# Patient Record
Sex: MALE | Race: WHITE | NOT HISPANIC OR LATINO | Employment: FULL TIME | URBAN - METROPOLITAN AREA
[De-identification: names, ages, dates, MRNs, and addresses within clinical notes are randomized per-mention and may not be internally consistent; named-entity substitution may affect disease eponyms.]

---

## 2017-04-13 ENCOUNTER — GENERIC CONVERSION - ENCOUNTER (OUTPATIENT)
Dept: OTHER | Facility: OTHER | Age: 32
End: 2017-04-13

## 2017-10-30 ENCOUNTER — GENERIC CONVERSION - ENCOUNTER (OUTPATIENT)
Dept: OTHER | Facility: OTHER | Age: 32
End: 2017-10-30

## 2017-10-30 DIAGNOSIS — R68.89 OTHER GENERAL SYMPTOMS AND SIGNS: ICD-10-CM

## 2017-10-30 DIAGNOSIS — G47.00 INSOMNIA: ICD-10-CM

## 2017-10-30 DIAGNOSIS — E87.5 HYPERKALEMIA: ICD-10-CM

## 2017-10-30 DIAGNOSIS — E29.1 TESTICULAR HYPOFUNCTION: ICD-10-CM

## 2017-10-30 DIAGNOSIS — R53.83 OTHER FATIGUE: ICD-10-CM

## 2017-10-30 DIAGNOSIS — E04.1 NONTOXIC SINGLE THYROID NODULE: ICD-10-CM

## 2017-11-04 ENCOUNTER — APPOINTMENT (OUTPATIENT)
Dept: LAB | Facility: HOSPITAL | Age: 32
End: 2017-11-04
Payer: COMMERCIAL

## 2017-11-04 ENCOUNTER — HOSPITAL ENCOUNTER (OUTPATIENT)
Dept: ULTRASOUND IMAGING | Facility: HOSPITAL | Age: 32
Discharge: HOME | End: 2017-11-04
Payer: COMMERCIAL

## 2017-11-04 DIAGNOSIS — E29.1 TESTICULAR HYPOFUNCTION: ICD-10-CM

## 2017-11-04 DIAGNOSIS — R53.83 OTHER FATIGUE: ICD-10-CM

## 2017-11-04 DIAGNOSIS — E04.1 NONTOXIC SINGLE THYROID NODULE: ICD-10-CM

## 2017-11-04 DIAGNOSIS — G47.00 INSOMNIA: ICD-10-CM

## 2017-11-04 DIAGNOSIS — R68.89 OTHER GENERAL SYMPTOMS AND SIGNS: ICD-10-CM

## 2017-11-04 LAB
25(OH)D3 SERPL-MCNC: 35.4 NG/ML (ref 30–100)
ALBUMIN SERPL BCP-MCNC: 4.1 G/DL (ref 3.5–5)
ALP SERPL-CCNC: 56 U/L (ref 46–116)
ALT SERPL W P-5'-P-CCNC: 75 U/L (ref 12–78)
ANION GAP SERPL CALCULATED.3IONS-SCNC: 3 MMOL/L (ref 4–13)
AST SERPL W P-5'-P-CCNC: 38 U/L (ref 5–45)
BASOPHILS # BLD AUTO: 0.05 THOUSANDS/ΜL (ref 0–0.1)
BASOPHILS NFR BLD AUTO: 1 % (ref 0–1)
BILIRUB SERPL-MCNC: 1.82 MG/DL (ref 0.2–1)
BUN SERPL-MCNC: 14 MG/DL (ref 5–25)
CALCIUM SERPL-MCNC: 9.2 MG/DL (ref 8.3–10.1)
CHLORIDE SERPL-SCNC: 106 MMOL/L (ref 100–108)
CHOLEST SERPL-MCNC: 163 MG/DL (ref 50–200)
CO2 SERPL-SCNC: 33 MMOL/L (ref 21–32)
CREAT SERPL-MCNC: 1.08 MG/DL (ref 0.6–1.3)
EOSINOPHIL # BLD AUTO: 0.08 THOUSAND/ΜL (ref 0–0.61)
EOSINOPHIL NFR BLD AUTO: 2 % (ref 0–6)
ERYTHROCYTE [DISTWIDTH] IN BLOOD BY AUTOMATED COUNT: 13.8 % (ref 11.6–15.1)
GFR SERPL CREATININE-BSD FRML MDRD: 89 ML/MIN/1.73SQ M
GLUCOSE P FAST SERPL-MCNC: 76 MG/DL (ref 65–99)
HCT VFR BLD AUTO: 44 % (ref 36.5–49.3)
HDLC SERPL-MCNC: 84 MG/DL (ref 40–60)
HGB BLD-MCNC: 14.6 G/DL (ref 12–17)
LDLC SERPL CALC-MCNC: 70 MG/DL (ref 0–100)
LYMPHOCYTES # BLD AUTO: 1.12 THOUSANDS/ΜL (ref 0.6–4.47)
LYMPHOCYTES NFR BLD AUTO: 30 % (ref 14–44)
MCH RBC QN AUTO: 30.7 PG (ref 26.8–34.3)
MCHC RBC AUTO-ENTMCNC: 33.2 G/DL (ref 31.4–37.4)
MCV RBC AUTO: 92 FL (ref 82–98)
MONOCYTES # BLD AUTO: 0.13 THOUSAND/ΜL (ref 0.17–1.22)
MONOCYTES NFR BLD AUTO: 4 % (ref 4–12)
NEUTROPHILS # BLD AUTO: 2.38 THOUSANDS/ΜL (ref 1.85–7.62)
NEUTS SEG NFR BLD AUTO: 63 % (ref 43–75)
NRBC BLD AUTO-RTO: 0 /100 WBCS
PLATELET # BLD AUTO: 178 THOUSANDS/UL (ref 149–390)
PMV BLD AUTO: 10.5 FL (ref 8.9–12.7)
POTASSIUM SERPL-SCNC: 5.7 MMOL/L (ref 3.5–5.3)
PROT SERPL-MCNC: 7.2 G/DL (ref 6.4–8.2)
RBC # BLD AUTO: 4.76 MILLION/UL (ref 3.88–5.62)
SODIUM SERPL-SCNC: 142 MMOL/L (ref 136–145)
T4 FREE SERPL-MCNC: 0.87 NG/DL (ref 0.76–1.46)
TESTOST SERPL-MCNC: 541.9 NG/DL (ref 241–827)
TRIGL SERPL-MCNC: 46 MG/DL
TSH SERPL DL<=0.05 MIU/L-ACNC: 0.86 UIU/ML (ref 0.36–3.74)
WBC # BLD AUTO: 3.76 THOUSAND/UL (ref 4.31–10.16)

## 2017-11-04 PROCEDURE — 84439 ASSAY OF FREE THYROXINE: CPT

## 2017-11-04 PROCEDURE — 84403 ASSAY OF TOTAL TESTOSTERONE: CPT

## 2017-11-04 PROCEDURE — 84443 ASSAY THYROID STIM HORMONE: CPT

## 2017-11-04 PROCEDURE — 80061 LIPID PANEL: CPT

## 2017-11-04 PROCEDURE — 80053 COMPREHEN METABOLIC PANEL: CPT

## 2017-11-04 PROCEDURE — 85025 COMPLETE CBC W/AUTO DIFF WBC: CPT

## 2017-11-04 PROCEDURE — 36415 COLL VENOUS BLD VENIPUNCTURE: CPT

## 2017-11-04 PROCEDURE — 76536 US EXAM OF HEAD AND NECK: CPT

## 2017-11-04 PROCEDURE — 82306 VITAMIN D 25 HYDROXY: CPT

## 2017-11-06 ENCOUNTER — GENERIC CONVERSION - ENCOUNTER (OUTPATIENT)
Dept: OTHER | Facility: OTHER | Age: 32
End: 2017-11-06

## 2017-11-22 ENCOUNTER — APPOINTMENT (OUTPATIENT)
Dept: LAB | Facility: HOSPITAL | Age: 32
End: 2017-11-22
Payer: COMMERCIAL

## 2017-11-22 DIAGNOSIS — E87.5 HYPERKALEMIA: ICD-10-CM

## 2017-11-22 LAB
ALBUMIN SERPL BCP-MCNC: 4.2 G/DL (ref 3.5–5)
ALP SERPL-CCNC: 50 U/L (ref 46–116)
ALT SERPL W P-5'-P-CCNC: 56 U/L (ref 12–78)
ANION GAP SERPL CALCULATED.3IONS-SCNC: 10 MMOL/L (ref 4–13)
AST SERPL W P-5'-P-CCNC: 24 U/L (ref 5–45)
BASOPHILS # BLD AUTO: 0.04 THOUSANDS/ΜL (ref 0–0.1)
BASOPHILS NFR BLD AUTO: 1 % (ref 0–1)
BILIRUB SERPL-MCNC: 1.6 MG/DL (ref 0.2–1)
BUN SERPL-MCNC: 33 MG/DL (ref 5–25)
CALCIUM SERPL-MCNC: 8.8 MG/DL (ref 8.3–10.1)
CHLORIDE SERPL-SCNC: 100 MMOL/L (ref 100–108)
CO2 SERPL-SCNC: 24 MMOL/L (ref 21–32)
CREAT SERPL-MCNC: 0.85 MG/DL (ref 0.6–1.3)
EOSINOPHIL # BLD AUTO: 0.14 THOUSAND/ΜL (ref 0–0.61)
EOSINOPHIL NFR BLD AUTO: 3 % (ref 0–6)
ERYTHROCYTE [DISTWIDTH] IN BLOOD BY AUTOMATED COUNT: 14.1 % (ref 11.6–15.1)
GFR SERPL CREATININE-BSD FRML MDRD: 114 ML/MIN/1.73SQ M
GLUCOSE P FAST SERPL-MCNC: 81 MG/DL (ref 65–99)
HCT VFR BLD AUTO: 38.8 % (ref 36.5–49.3)
HGB BLD-MCNC: 13.3 G/DL (ref 12–17)
LYMPHOCYTES # BLD AUTO: 1.56 THOUSANDS/ΜL (ref 0.6–4.47)
LYMPHOCYTES NFR BLD AUTO: 32 % (ref 14–44)
MCH RBC QN AUTO: 30.9 PG (ref 26.8–34.3)
MCHC RBC AUTO-ENTMCNC: 34.3 G/DL (ref 31.4–37.4)
MCV RBC AUTO: 90 FL (ref 82–98)
MONOCYTES # BLD AUTO: 0.26 THOUSAND/ΜL (ref 0.17–1.22)
MONOCYTES NFR BLD AUTO: 5 % (ref 4–12)
NEUTROPHILS # BLD AUTO: 2.93 THOUSANDS/ΜL (ref 1.85–7.62)
NEUTS SEG NFR BLD AUTO: 59 % (ref 43–75)
NRBC BLD AUTO-RTO: 0 /100 WBCS
PLATELET # BLD AUTO: 201 THOUSANDS/UL (ref 149–390)
PMV BLD AUTO: 9.9 FL (ref 8.9–12.7)
POTASSIUM SERPL-SCNC: 3.8 MMOL/L (ref 3.5–5.3)
PROT SERPL-MCNC: 7.1 G/DL (ref 6.4–8.2)
RBC # BLD AUTO: 4.3 MILLION/UL (ref 3.88–5.62)
SODIUM SERPL-SCNC: 134 MMOL/L (ref 136–145)
WBC # BLD AUTO: 4.93 THOUSAND/UL (ref 4.31–10.16)

## 2017-11-22 PROCEDURE — 36415 COLL VENOUS BLD VENIPUNCTURE: CPT

## 2017-11-22 PROCEDURE — 80053 COMPREHEN METABOLIC PANEL: CPT

## 2017-11-22 PROCEDURE — 85025 COMPLETE CBC W/AUTO DIFF WBC: CPT

## 2017-11-27 ENCOUNTER — ALLSCRIPTS OFFICE VISIT (OUTPATIENT)
Dept: OTHER | Facility: OTHER | Age: 32
End: 2017-11-27

## 2018-01-12 NOTE — RESULT NOTES
Message   Recorded as Task   Date: 11/06/2017 05:42 AM, Created By: Wali Butter   Task Name: Follow Up   Assigned To: Pratibha Rodriguez   Regarding Patient: Margo Sheikh, Status: Active   [facility] 06 Nov 2017 5:42 AM     TASK CREATED  his bloodwork showed a high potassium and borderline low bloodcount  probably both are non specific findings but rest was ok  i am putting in to recheck before follow up appointment   [facility] - 06 Nov 2017 11:18 AM     TASK EDITED  I spoke with Deysi Cassidy and he is aware of his results, and will get the next set of labs befor his appointment on 11/27/201        Verified Results  (1) CBC/PLT/DIFF [id number] 08:54AM Wali Ana   TW Order Number: [id number]     Test Name Result Flag Reference   WBC COUNT 3 76 Thousand/uL L 4 31-10 16   RBC COUNT 4 76 Million/uL  3 88-5 62   HEMOGLOBIN 14 6 g/dL  12 0-17 0   HEMATOCRIT 44 0 %  36 5-49 3   MCV 92 fL  82-98   MCH 30 7 pg  26 8-34 3   MCHC 33 2 g/dL  31 4-37 4   RDW 13 8 %  11 6-15 1   MPV 10 5 fL  8 9-12 7   PLATELET COUNT 621 Thousands/uL  149-390   nRBC AUTOMATED 0 /100 WBCs     NEUTROPHILS RELATIVE PERCENT 63 %  43-75   LYMPHOCYTES RELATIVE PERCENT 30 %  14-44   MONOCYTES RELATIVE PERCENT 4 %  4-12   EOSINOPHILS RELATIVE PERCENT 2 %  0-6   BASOPHILS RELATIVE PERCENT 1 %  0-1   NEUTROPHILS ABSOLUTE COUNT 2 38 Thousands/? ??L  1 85-7 62   LYMPHOCYTES ABSOLUTE COUNT 1 12 Thousands/? ??L  0 60-4 47   MONOCYTES ABSOLUTE COUNT 0 13 Thousand/? ??L L 0 17-1 22   EOSINOPHILS ABSOLUTE COUNT 0 08 Thousand/? ??L  0 00-0 61   BASOPHILS ABSOLUTE COUNT 0 05 Thousands/? ??L  0 00-0 10     (1) COMPREHENSIVE METABOLIC PANEL [id number] 13:90XY Wali Perez   TW Order Number: [id number]     Test Name Result Flag Reference   SODIUM 142 mmol/L  136-145   POTASSIUM 5 7 mmol/L H 3 5-5 3   CHLORIDE 106 mmol/L  100-108   CARBON DIOXIDE 33 mmol/L H 21-32   ANION GAP (CALC) 3 mmol/L L 4-13   BLOOD UREA NITROGEN 14 mg/dL  5-25   CREATININE 1 08 mg/dL  0 60-1 30   Standardized to IDMS reference method   CALCIUM 9 2 mg/dL  8 3-10 1   BILI, TOTAL 1 82 mg/dL H 0 20-1 00   ALK PHOSPHATAS 56 U/L     ALT (SGPT) 75 U/L  12-78   Specimen collection should occur prior to Sulfasalazine administration due to the potential for falsely depressed results  AST(SGOT) 38 U/L  5-45   Specimen collection should occur prior to Sulfasalazine administration due to the potential for falsely depressed results  ALBUMIN 4 1 g/dL  3 5-5 0   TOTAL PROTEIN 7 2 g/dL  6 4-8 2   eGFR 89 ml/min/1 73sq m     National Kidney Disease Education Program recommendations are as follows:  GFR calculation is accurate only with a steady state creatinine  Chronic Kidney disease less than 60 ml/min/1 73 sq  meters  Kidney failure less than 15 ml/min/1 73 sq  meters  GLUCOSE FASTING 76 mg/dL  65-99   Specimen collection should occur prior to Sulfasalazine administration due to the potential for falsely depressed results  Specimen collection should occur prior to Sulfapyridine administration due to the potential for falsely elevated results  (1) LIPID PANEL, FASTING [id number] 08:54AM Connie Joel    Order Number: [id number]     Test Name Result Flag Reference   CHOLESTEROL 163 mg/dL     HDL,DIRECT 84 mg/dL H 40-60   Specimen collection should occur prior to Metamizole administration due to the potential for falsley depressed results  LDL CHOLESTEROL CALCULATED 70 mg/dL  0-100   Triglyceride:        Normal <150 mg/dl   Borderline High 150-199 mg/dl   High 200-499 mg/dl   Very High >499 mg/dl      Cholesterol:       Desirable <200 mg/dl    Borderline High 200-239 mg/dl    High >239 mg/dl      HDL Cholesterol:       High>59 mg/dL    Low <41 mg/dL      This screening LDL is a calculated result  It does not have the accuracy of the Direct Measured LDL in the monitoring of patients with hyperlipidemia and/or statin therapy     Direct Measure LDL (QXD682) must be ordered separately in these patients  TRIGLYCERIDES 46 mg/dL  <=150   Specimen collection should occur prior to N-Acetylcysteine or Metamizole administration due to the potential for falsely depressed results  (1) T4, FREE [id number] 08:54AM Kip Moscoso    Order Number: [id number]     Test Name Result Flag Reference   T4,FREE 0 87 ng/dL  0 76-1 46   Specimen collection should occur prior to Sulfasalazine administration due to the potential for falsely elevated results  (1) TSH [id number] 08:54AM Kip Moscoso    Order Number: [id number]     Test Name Result Flag Reference   TSH 0 856 uIU/mL  0 358-3 740   Patients undergoing fluorescein dye angiography may retain small amounts of fluorescein in the body for 48-72 hours post procedure  Samples containing fluorescein can produce falsely depressed TSH values  If the patient had this procedure,a specimen should be resubmitted post fluorescein clearance  (1) VITAMIN D 25-HYDROXY [id number] 08:54AM Kip Moscoso    Order Number: [id number]     Test Name Result Flag Reference   VIT D 25-HYDROX 35 4 ng/mL  30 0-100 0   This assay is a certified procedure of the CDC Vitamin D Standardization Certification Program (VDSCP)     Deficiency <20ng/ml   Insufficiency 20-30ng/ml   Sufficient  ng/ml     *Patients undergoing fluorescein dye angiography may retain small amounts of fluorescein in the body for 48-72 hours post procedure  Samples containing fluorescein can produce falsely elevated Vitamin D values  If the patient had this procedure, a specimen should be resubmitted post fluorescein clearance  (1) TESTOSTERONE [id number] 08:54AM Kip Moscoso    Order Number: [id number]     Test Name Result Flag Reference   TESTOSTERONE 541 9 ng/dL  952-967       Plan  Hyperkalemia    · (1) CBC/PLT/DIFF; Status:Active;  Requested DANNY:[id number];    · (1) COMPREHENSIVE METABOLIC PANEL; Status:Active;  Requested ZQL:[id number];

## 2018-01-14 NOTE — RESULT NOTES
Message   Recorded as Task   Date: 11/22/2017 02:47 PM, Created By: Radha Saldivar   Task Name: Follow Up   Assigned To: Fozia Gupta   Regarding Patient: Gladys Le, Status: Active   CommentLaurine Ortiz - 22 Nov 2017 2:47 PM     TASK CREATED  bloodwork improved on recheck   Charity Siu - 22 Nov 2017 3:03 PM     TASK EDITED  I spoke with Mame Mckeon and he is aware of his results

## 2018-01-22 VITALS
WEIGHT: 175 LBS | RESPIRATION RATE: 12 BRPM | DIASTOLIC BLOOD PRESSURE: 60 MMHG | BODY MASS INDEX: 26.52 KG/M2 | SYSTOLIC BLOOD PRESSURE: 110 MMHG | HEART RATE: 60 BPM | HEIGHT: 68 IN | TEMPERATURE: 96.6 F

## 2018-01-22 VITALS
HEART RATE: 76 BPM | DIASTOLIC BLOOD PRESSURE: 64 MMHG | HEIGHT: 68 IN | RESPIRATION RATE: 12 BRPM | BODY MASS INDEX: 25.99 KG/M2 | SYSTOLIC BLOOD PRESSURE: 106 MMHG | WEIGHT: 171.5 LBS | TEMPERATURE: 97.8 F

## 2018-12-17 ENCOUNTER — OFFICE VISIT (OUTPATIENT)
Dept: INTERNAL MEDICINE CLINIC | Facility: CLINIC | Age: 33
End: 2018-12-17
Payer: OTHER GOVERNMENT

## 2018-12-17 VITALS
BODY MASS INDEX: 27.37 KG/M2 | HEART RATE: 57 BPM | HEIGHT: 68 IN | OXYGEN SATURATION: 97 % | SYSTOLIC BLOOD PRESSURE: 111 MMHG | DIASTOLIC BLOOD PRESSURE: 70 MMHG | TEMPERATURE: 98.1 F | WEIGHT: 180.6 LBS

## 2018-12-17 DIAGNOSIS — M79.662 BILATERAL CALF PAIN: ICD-10-CM

## 2018-12-17 DIAGNOSIS — R60.0 PEDAL EDEMA: Primary | ICD-10-CM

## 2018-12-17 DIAGNOSIS — E55.9 VITAMIN D DEFICIENCY: ICD-10-CM

## 2018-12-17 DIAGNOSIS — R20.2 NUMBNESS AND TINGLING OF RIGHT LOWER EXTREMITY: ICD-10-CM

## 2018-12-17 DIAGNOSIS — M79.661 BILATERAL CALF PAIN: ICD-10-CM

## 2018-12-17 DIAGNOSIS — R20.0 NUMBNESS AND TINGLING OF RIGHT LOWER EXTREMITY: ICD-10-CM

## 2018-12-17 PROCEDURE — 99214 OFFICE O/P EST MOD 30 MIN: CPT | Performed by: INTERNAL MEDICINE

## 2018-12-17 RX ORDER — UBIDECARENONE 75 MG
CAPSULE ORAL DAILY
COMMUNITY

## 2018-12-18 PROBLEM — E55.9 VITAMIN D DEFICIENCY: Status: ACTIVE | Noted: 2018-12-18

## 2018-12-18 PROBLEM — R60.0 PEDAL EDEMA: Status: ACTIVE | Noted: 2018-12-18

## 2018-12-18 NOTE — PROGRESS NOTES
Assessment/Plan:  We discussed the differential diagnosis of his symptoms  At this time likely cause is venous stasis  Will get a venous duplex to rule out any DVT  Check vitamin-D and B12 levels with previous history of deficiencies  Start compression stockings  Tingling and numbness likely due to pedal edema vs B12 deficiency  Diagnoses and all orders for this visit:    Pedal edema  -     CBC and differential  -     Comprehensive metabolic panel  -     Magnesium  -     Vitamin B12  -     Vitamin D 25 hydroxy  -     NT-BNP PRO  -     VAS lower limb venous duplex study, complete bilateral; Future  -     CompreFit (below knee) 20-30 mmHg    Bilateral calf pain  -     CBC and differential  -     Comprehensive metabolic panel  -     Magnesium  -     Vitamin B12  -     Vitamin D 25 hydroxy  -     NT-BNP PRO  -     VAS lower limb venous duplex study, complete bilateral; Future  -     CompreFit (below knee) 20-30 mmHg    Vitamin D deficiency  -     CBC and differential  -     Comprehensive metabolic panel  -     Magnesium  -     Vitamin B12  -     Vitamin D 25 hydroxy  -     NT-BNP PRO    Numbness and tingling of right lower extremity  -     CBC and differential  -     Comprehensive metabolic panel  -     Magnesium  -     Vitamin B12  -     Vitamin D 25 hydroxy  -     NT-BNP PRO    Other orders  -     cyanocobalamin (VITAMIN B-12) 100 mcg tablet; Take by mouth daily          Subjective:   Chief Complaint   Patient presents with    Leg Swelling     pain and swelling in legs for months        Patient ID: Jerilyn Clemente is a 26 y o  male  He comes in for an acute appointment  He notes that for the last several weeks to months he has had bilateral lower extremity edema and pain  He has noticed some tingling and numbness on the lateral aspect of right foot  He denies any falls or injuries, no back pain  No shortness of breath or orthopnea  He has running about 3-5 miles every day with no discomfort    He notes that he went to the physicians at the [facility] and the prescribe him pain medications without on distending what his pain is from  He was told that he has vitamin-D deficiency but not taking any supplements at this time  The following portions of the patient's history were reviewed and updated as appropriate: current medications, past medical history, past social history and past surgical history  PHQ-9 Depression Screening    PHQ-9:    Frequency of the following problems over the past two weeks:                Current Outpatient Prescriptions:     cyanocobalamin (VITAMIN B-12) 100 mcg tablet, Take by mouth daily, Disp: , Rfl:     Review of Systems   Constitutional: Negative for fatigue, fever and unexpected weight change  HENT: Negative for ear pain, hearing loss and sore throat  Eyes: Negative for pain and discharge  Respiratory: Negative for cough, chest tightness and shortness of breath  Cardiovascular: Positive for leg swelling  Negative for chest pain and palpitations  Gastrointestinal: Negative for abdominal pain, blood in stool, constipation, diarrhea and nausea  Genitourinary: Negative for dysuria, frequency and hematuria  Musculoskeletal: Positive for arthralgias  Negative for joint swelling  Skin: Negative for rash  Allergic/Immunologic: Negative for immunocompromised state  Neurological: Negative for dizziness and headaches  Hematological: Negative for adenopathy  Psychiatric/Behavioral: Negative for confusion and sleep disturbance  Objective:  /70 (BP Location: Left arm, Patient Position: Sitting, Cuff Size: Standard)   Pulse 57   Temp 98 1 °F (36 7 °C) (Tympanic)   Ht 5' 8" (1 727 m)   Wt 81 9 kg (180 lb 9 6 oz)   SpO2 97%   BMI 27 46 kg/m²      Physical Exam   Constitutional: He appears well-developed and well-nourished  HENT:   Head: Normocephalic and atraumatic     Right Ear: Tympanic membrane normal    Left Ear: Tympanic membrane normal  Nose: Nose normal    Mouth/Throat: Oropharynx is clear and moist  No posterior oropharyngeal edema or posterior oropharyngeal erythema  Eyes: Pupils are equal, round, and reactive to light  Conjunctivae are normal  Right eye exhibits no discharge  Neck: Normal range of motion  Neck supple  No thyromegaly present  Cardiovascular: Normal rate, regular rhythm, S1 normal, S2 normal and normal heart sounds  PMI is not displaced  No murmur heard  Pulmonary/Chest: Effort normal and breath sounds normal  No accessory muscle usage  No apnea  No respiratory distress  He has no rhonchi  He has no rales  Abdominal: Soft  Normal appearance and bowel sounds are normal  He exhibits no shifting dullness  There is no hepatosplenomegaly  There is no tenderness  There is no rebound and no CVA tenderness  Musculoskeletal: Normal range of motion  He exhibits no edema or tenderness  Legs:  Lymphadenopathy:     He has no cervical adenopathy  Neurological: He is alert  Skin: Skin is warm and intact  No rash noted  Psychiatric: He has a normal mood and affect  His speech is normal    Nursing note and vitals reviewed  No results found for this or any previous visit (from the past 1008 hour(s))  ]    No results found

## 2018-12-19 ENCOUNTER — APPOINTMENT (OUTPATIENT)
Dept: LAB | Facility: HOSPITAL | Age: 33
End: 2018-12-19
Payer: OTHER GOVERNMENT

## 2018-12-19 ENCOUNTER — HOSPITAL ENCOUNTER (OUTPATIENT)
Dept: NON INVASIVE DIAGNOSTICS | Facility: HOSPITAL | Age: 33
Discharge: HOME | End: 2018-12-19
Payer: OTHER GOVERNMENT

## 2018-12-19 DIAGNOSIS — M79.661 BILATERAL CALF PAIN: ICD-10-CM

## 2018-12-19 DIAGNOSIS — M79.662 BILATERAL CALF PAIN: ICD-10-CM

## 2018-12-19 DIAGNOSIS — R60.0 PEDAL EDEMA: ICD-10-CM

## 2018-12-19 LAB
25(OH)D3 SERPL-MCNC: 24.9 NG/ML (ref 30–100)
ALBUMIN SERPL BCP-MCNC: 3.9 G/DL (ref 3.5–5)
ALP SERPL-CCNC: 71 U/L (ref 46–116)
ALT SERPL W P-5'-P-CCNC: 60 U/L (ref 12–78)
ANION GAP SERPL CALCULATED.3IONS-SCNC: 9 MMOL/L (ref 4–13)
AST SERPL W P-5'-P-CCNC: 100 U/L (ref 5–45)
BASOPHILS # BLD AUTO: 0.05 THOUSANDS/ΜL (ref 0–0.1)
BASOPHILS NFR BLD AUTO: 1 % (ref 0–1)
BILIRUB SERPL-MCNC: 0.37 MG/DL (ref 0.2–1)
BUN SERPL-MCNC: 51 MG/DL (ref 5–25)
CALCIUM SERPL-MCNC: 9.1 MG/DL (ref 8.3–10.1)
CHLORIDE SERPL-SCNC: 103 MMOL/L (ref 100–108)
CO2 SERPL-SCNC: 28 MMOL/L (ref 21–32)
CREAT SERPL-MCNC: 1 MG/DL (ref 0.6–1.3)
EOSINOPHIL # BLD AUTO: 0.08 THOUSAND/ΜL (ref 0–0.61)
EOSINOPHIL NFR BLD AUTO: 1 % (ref 0–6)
ERYTHROCYTE [DISTWIDTH] IN BLOOD BY AUTOMATED COUNT: 12.9 % (ref 11.6–15.1)
GFR SERPL CREATININE-BSD FRML MDRD: 97 ML/MIN/1.73SQ M
GLUCOSE SERPL-MCNC: 78 MG/DL (ref 65–140)
HCT VFR BLD AUTO: 44.9 % (ref 36.5–49.3)
HGB BLD-MCNC: 14.2 G/DL (ref 12–17)
IMM GRANULOCYTES # BLD AUTO: 0.02 THOUSAND/UL (ref 0–0.2)
IMM GRANULOCYTES NFR BLD AUTO: 0 % (ref 0–2)
LYMPHOCYTES # BLD AUTO: 1.92 THOUSANDS/ΜL (ref 0.6–4.47)
LYMPHOCYTES NFR BLD AUTO: 25 % (ref 14–44)
MAGNESIUM SERPL-MCNC: 1.6 MG/DL (ref 1.6–2.6)
MCH RBC QN AUTO: 29.7 PG (ref 26.8–34.3)
MCHC RBC AUTO-ENTMCNC: 31.6 G/DL (ref 31.4–37.4)
MCV RBC AUTO: 94 FL (ref 82–98)
MONOCYTES # BLD AUTO: 0.38 THOUSAND/ΜL (ref 0.17–1.22)
MONOCYTES NFR BLD AUTO: 5 % (ref 4–12)
NEUTROPHILS # BLD AUTO: 5.11 THOUSANDS/ΜL (ref 1.85–7.62)
NEUTS SEG NFR BLD AUTO: 68 % (ref 43–75)
NRBC BLD AUTO-RTO: 0 /100 WBCS
NT-PROBNP SERPL-MCNC: 35 PG/ML
PLATELET # BLD AUTO: 182 THOUSANDS/UL (ref 149–390)
PMV BLD AUTO: 10.1 FL (ref 8.9–12.7)
POTASSIUM SERPL-SCNC: 3.8 MMOL/L (ref 3.5–5.3)
PROT SERPL-MCNC: 7.2 G/DL (ref 6.4–8.2)
RBC # BLD AUTO: 4.78 MILLION/UL (ref 3.88–5.62)
SODIUM SERPL-SCNC: 140 MMOL/L (ref 136–145)
VIT B12 SERPL-MCNC: 714 PG/ML (ref 100–900)
WBC # BLD AUTO: 7.56 THOUSAND/UL (ref 4.31–10.16)

## 2018-12-19 PROCEDURE — 82607 VITAMIN B-12: CPT | Performed by: INTERNAL MEDICINE

## 2018-12-19 PROCEDURE — 82306 VITAMIN D 25 HYDROXY: CPT | Performed by: INTERNAL MEDICINE

## 2018-12-19 PROCEDURE — 93970 EXTREMITY STUDY: CPT

## 2018-12-19 PROCEDURE — 36415 COLL VENOUS BLD VENIPUNCTURE: CPT | Performed by: INTERNAL MEDICINE

## 2018-12-19 PROCEDURE — 80053 COMPREHEN METABOLIC PANEL: CPT | Performed by: INTERNAL MEDICINE

## 2018-12-19 PROCEDURE — 83880 ASSAY OF NATRIURETIC PEPTIDE: CPT | Performed by: INTERNAL MEDICINE

## 2018-12-19 PROCEDURE — 83735 ASSAY OF MAGNESIUM: CPT | Performed by: INTERNAL MEDICINE

## 2018-12-19 PROCEDURE — 85025 COMPLETE CBC W/AUTO DIFF WBC: CPT | Performed by: INTERNAL MEDICINE

## 2018-12-20 ENCOUNTER — TELEPHONE (OUTPATIENT)
Dept: INTERNAL MEDICINE CLINIC | Facility: CLINIC | Age: 33
End: 2018-12-20

## 2018-12-20 DIAGNOSIS — R74.8 ABNORMAL LIVER ENZYMES: ICD-10-CM

## 2018-12-20 DIAGNOSIS — E55.9 VITAMIN D DEFICIENCY: Primary | ICD-10-CM

## 2018-12-20 PROCEDURE — 93970 EXTREMITY STUDY: CPT | Performed by: SURGERY

## 2018-12-20 NOTE — TELEPHONE ENCOUNTER
----- Message from Austyn Ruano MD sent at 12/20/2018  9:33 AM EST -----  Vitamin-D levels were slightly low, I sent in a prescription for supplement  otherwise blood work was okay except for elevated liver function test   I would recommend to have blood work again in 1 month, refrain from alcohol use

## 2018-12-20 NOTE — TELEPHONE ENCOUNTER
----- Message from Claudene Hammans, MD sent at 12/20/2018  9:31 AM EST -----  No blood clots on venous duplex

## 2018-12-20 NOTE — TELEPHONE ENCOUNTER
I spoke with Neeta Pop and he is aware of his results and understood instructions  I mailed out new lab slip

## 2019-11-11 ENCOUNTER — APPOINTMENT (EMERGENCY)
Dept: RADIOLOGY | Facility: HOSPITAL | Age: 34
End: 2019-11-11
Payer: OTHER GOVERNMENT

## 2019-11-11 ENCOUNTER — TELEPHONE (OUTPATIENT)
Dept: INTERNAL MEDICINE CLINIC | Facility: CLINIC | Age: 34
End: 2019-11-11

## 2019-11-11 ENCOUNTER — HOSPITAL ENCOUNTER (EMERGENCY)
Facility: HOSPITAL | Age: 34
Discharge: HOME | End: 2019-11-11
Attending: EMERGENCY MEDICINE
Payer: OTHER GOVERNMENT

## 2019-11-11 VITALS
HEART RATE: 60 BPM | OXYGEN SATURATION: 99 % | DIASTOLIC BLOOD PRESSURE: 73 MMHG | SYSTOLIC BLOOD PRESSURE: 122 MMHG | RESPIRATION RATE: 18 BRPM

## 2019-11-11 VITALS
SYSTOLIC BLOOD PRESSURE: 128 MMHG | DIASTOLIC BLOOD PRESSURE: 71 MMHG | WEIGHT: 184 LBS | HEART RATE: 55 BPM | BODY MASS INDEX: 27.98 KG/M2

## 2019-11-11 DIAGNOSIS — S43.005A CLOSED DISLOCATION OF LEFT SHOULDER, INITIAL ENCOUNTER: Primary | ICD-10-CM

## 2019-11-11 DIAGNOSIS — S43.005A DISLOCATION OF LEFT SHOULDER JOINT, INITIAL ENCOUNTER: Primary | ICD-10-CM

## 2019-11-11 PROCEDURE — 23650 CLTX SHO DSLC W/MNPJ WO ANES: CPT | Performed by: EMERGENCY MEDICINE

## 2019-11-11 PROCEDURE — 99283 EMERGENCY DEPT VISIT LOW MDM: CPT

## 2019-11-11 PROCEDURE — 73030 X-RAY EXAM OF SHOULDER: CPT

## 2019-11-11 PROCEDURE — 73020 X-RAY EXAM OF SHOULDER: CPT

## 2019-11-11 PROCEDURE — 99203 OFFICE O/P NEW LOW 30 MIN: CPT | Performed by: ORTHOPAEDIC SURGERY

## 2019-11-11 PROCEDURE — 99283 EMERGENCY DEPT VISIT LOW MDM: CPT | Performed by: EMERGENCY MEDICINE

## 2019-11-11 PROCEDURE — 96374 THER/PROPH/DIAG INJ IV PUSH: CPT

## 2019-11-11 RX ORDER — HYDROMORPHONE HCL/PF 1 MG/ML
0.5 SYRINGE (ML) INJECTION ONCE
Status: COMPLETED | OUTPATIENT
Start: 2019-11-11 | End: 2019-11-11

## 2019-11-11 RX ADMIN — HYDROMORPHONE HYDROCHLORIDE 0.5 MG: 1 INJECTION, SOLUTION INTRAMUSCULAR; INTRAVENOUS; SUBCUTANEOUS at 04:08

## 2019-11-11 NOTE — DISCHARGE INSTRUCTIONS
Please follow-up with your primary care physician if symptoms do not improve  Please call and schedule appointment with Orthopedic surgery at the contact information below  Return to the emergency department if you develop any new or otherwise concerning symptoms

## 2019-11-11 NOTE — ED PROVIDER NOTES
History  Chief Complaint   Patient presents with    Shoulder Injury     pt reports he was sleeping and thinks his left arm dislocated     This is a 47-year-old male with no significant past medical history who presents to the emergency department this morning with a painful left shoulder  Patient states that he went to bed last night as normal [facility] without any shoulder pain and he woke up just prior to presentation with left shoulder pain and he is concerned that he dislocated shoulder  Patient denies any previous dislocations of the left shoulder and denies any recent trauma or fights or any history of seizures  Patient denies any numbness in his shoulders or any pain shooting down his arm or numbness in his hand  Patient has not taken any medications for the pain as of yet  Prior to Admission Medications   Prescriptions Last Dose Informant Patient Reported? Taking? Cholecalciferol (VITAMIN D3) 04242 units TABS   No No   Sig: Take 1 tablet (50,000 Units total) by mouth once a week for 12 doses   cyanocobalamin (VITAMIN B-12) 100 mcg tablet  Self Yes Yes   Sig: Take by mouth daily      Facility-Administered Medications: None       Past Medical History:   Diagnosis Date    Hypogonadism in male     Resolved 2017        History reviewed  No pertinent surgical history  Family History   Problem Relation Age of Onset    Other Mother     Stroke Mother     Diabetes Mother     Thyroid disease Mother      I have reviewed and agree with the history as documented  Social History     Tobacco Use    Smoking status: Former Smoker     Types: Cigarettes     Last attempt to quit: 2011     Years since quittin 8    Smokeless tobacco: Never Used   Substance Use Topics    Alcohol use: No    Drug use: Not on file        Review of Systems   Constitutional: Negative for chills, diaphoresis and fever  HENT: Negative for congestion, rhinorrhea, sinus pressure and sore throat      Eyes: Negative for visual disturbance  Respiratory: Negative for cough, chest tightness and shortness of breath  Cardiovascular: Negative for chest pain  Gastrointestinal: Negative for abdominal pain, constipation, diarrhea, nausea and vomiting  Genitourinary: Negative for dysuria, frequency, hematuria and urgency  Musculoskeletal: Positive for arthralgias  Negative for myalgias  Skin: Negative for color change and rash  Neurological: Negative for dizziness, numbness and headaches  Physical Exam  ED Triage Vitals   Temp Pulse Respirations Blood Pressure SpO2   -- 11/11/19 0335 11/11/19 0335 11/11/19 0335 11/11/19 0335    60 18 122/73 99 %      Temp src Heart Rate Source Patient Position - Orthostatic VS BP Location FiO2 (%)   -- 11/11/19 0335 11/11/19 0335 11/11/19 0335 --    Monitor Sitting Right arm       Pain Score       11/11/19 0408       Worst Possible Pain             Orthostatic Vital Signs  Vitals:    11/11/19 0335   BP: 122/73   Pulse: 60   Patient Position - Orthostatic VS: Sitting       Physical Exam   Constitutional: He is oriented to person, place, and time  He appears well-developed and well-nourished  No distress  HENT:   Head: Normocephalic and atraumatic  Eyes: Pupils are equal, round, and reactive to light  Conjunctivae are normal    Neck: Normal range of motion  Neck supple  No JVD present  Cardiovascular: Normal rate, regular rhythm and normal heart sounds  Exam reveals no gallop and no friction rub  No murmur heard  Pulmonary/Chest: Effort normal and breath sounds normal  No stridor  No respiratory distress  He has no wheezes  He has no rales  Abdominal: Soft  Bowel sounds are normal  He exhibits no distension  There is no tenderness  There is no guarding  Musculoskeletal: Normal range of motion  He exhibits tenderness and deformity  He exhibits no edema  Tenderness in the left shoulder with an obvious deformity/indication at the lateral border of the acromion    No difference in sensation in bilateral deltoids  Neurological: He is alert and oriented to person, place, and time  No cranial nerve deficit or sensory deficit  He exhibits normal muscle tone  Skin: Skin is warm and dry  No rash noted  He is not diaphoretic  No erythema  No pallor  Psychiatric: He has a normal mood and affect  His behavior is normal    Nursing note and vitals reviewed  ED Medications  Medications   HYDROmorphone (DILAUDID) injection 0 5 mg (0 5 mg Intravenous Given 11/11/19 0408)       Diagnostic Studies  Results Reviewed     None                 XR shoulder 2+ views LEFT    (Results Pending)   XR shoulder 1 vw left    (Results Pending)         Procedures  Orthopedic injury treatment  Date/Time: 11/11/2019 5:57 AM  Performed by: Danilo Zarate MD  Authorized by: Danilo Zarate MD     Patient Location:  ED  Other Assisting Provider: Yes (comment) (  [address] [place] [address])    Verbal consent obtained?: Yes    Risks and benefits: Risks, benefits and alternatives were discussed    Consent given by:  Patient  Patient identity confirmed:  Verbally with patient and arm band  Injury location:  Shoulder  Location details:  Left shoulder  Injury type:  Dislocation  Dislocation type: anterior    Chronicity:  New  Hill-Sachs deformity?: No    Neurovascular status: Neurovascularly intact    Distal perfusion: normal    Neurological function: normal    Range of motion: reduced    Local anesthesia used?: No    General anesthesia used?: No    Manipulation performed?: Yes    Reduction method: Cunningham technique  Reduction method: Cunningham technique  Reduction method: Cunningham technique  Reduction method: Cunningham technique  Reduction method: Cunningham technique  Reduction method: Cunningham technique    Skeletal traction used?: Yes    Reduction successful?: Yes    Confirmation: Reduction confirmed by x-ray    Immobilization:  Sling  Neurovascular status: Neurovascularly intact    Distal perfusion: normal    Neurological function: normal    Range of motion: normal    Patient tolerance:  Patient tolerated the procedure well with no immediate complications            ED Course                               MDM  Number of Diagnoses or Management Options  Closed dislocation of left shoulder, initial encounter:   Diagnosis management comments: Patient's shoulder was successfully reduced using the Cunningham technique  He noted significant relief of symptoms and was placed in a left arm sling  Patient given orthopedic surgery contact information and told that he needs to follow up with them and get placed into physical therapy  Patient discharged home in good condition with instructions to return to the ER if he develops any new or otherwise concerning symptoms  Disposition  Final diagnoses:   Closed dislocation of left shoulder, initial encounter     Time reflects when diagnosis was documented in both MDM as applicable and the Disposition within this note     Time User Action Codes Description Comment    11/11/2019  4:53 AM Deepa Gallegos Add [[id number]] Closed dislocation of left shoulder, initial encounter       ED Disposition     ED Disposition Condition Date/Time Comment    Discharge Stable Mon Nov 11, 2019  4:53 AM Wander Newberry discharge to home/self care              Follow-up Information     Follow up With Specialties Details Why Contact Info Additional Information    Kim Mesa MD Internal Medicine   [address] [facility] [address]  [phone]       [address] [place] [address] Emergency Department Emergency Medicine  If symptoms worsen [address]  [phone]  ED, [address] [place] [address]    [address] [facility]   [address] [id number]  [phone] [address] [place] [address], [address]   [phone]          Discharge Medication List as of 11/11/2019  5:47 AM      CONTINUE these medications which have NOT CHANGED    Details   cyanocobalamin (VITAMIN B-12) 100 mcg tablet Take by mouth daily, Historical Med      Cholecalciferol (VITAMIN D3) 64989 units TABS Take 1 tablet (50,000 Units total) by mouth once a week for 12 doses, Starting Thu 12/20/2018, Until Fri 3/8/2019, Normal           No discharge procedures on file  ED Provider  Attending physically available and evaluated Cathren Gaucher I managed the patient along with the ED Attending      Electronically Signed by         Alejo De Luna MD  11/11/19 7229

## 2019-11-11 NOTE — LETTER
November 11, 2019     Patient: Daniela Monge   YOB: 1985   Date of Visit: 11/11/2019       To Whom it May Concern:    Daniela Monge is under my professional care  He was seen in my office on 11/11/2019  He has dislocated his left shoulder and is currently in a shoulder immobilizer for 3 weeks  He should not drive for minimum of 3 weeks from now to allow the soft tissues to quiet  I will follow him closely in the office for return to work date  If you have any questions or concerns, please don't hesitate to call           Sincerely,          Ivory Bassett MD        CC: Daniela Monge

## 2019-11-11 NOTE — PROGRESS NOTES
Chief Complaint   Patient presents with    Left Shoulder - Pain           Assessment:  Anterior dislocation right shoulder    Plan : This is a serious injury in a young man with no apparent trauma  It is very unusual for a first-time dislocation to occur in  sleep, although much more common with recurrent dislocations  In any case he needs to give this a chance to calm down and the ligaments to begin healing  I put him in a shoulder mobilizer he should wear full-time for 3 weeks  I showed him exercises to do in and out of the sling  He may take it off for bathing as long as his wife helps him and he keeps his arm in at his side when he is bathing  The problem with these injuries is recurrence and we are always concerned about a dislocation happening again which would require surgery  He can put ice on the area for 15 minutes 4 times a day if needed for pain and can take Advil, Aleve, or Tylenol if needed  He should sleep semi sitting on a wedge pillow, multiple pillows or in a recliner so the arm does not slide off his chest at night  He really should not work as a  for a minimum of 3 weeks to allow the soft tissues to heal   I will see him in 3 weeks for clinical re exam, discontinuance of his sling, and beginning of a range-of-motion exercise program 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    HPI:   Patient is a 41-year-old RHD male who presents today for follow-up from the ED for spontaneous left shoulder dislocation that occurred on 11/11/2019  Patient reports that he awoke from a dead sleep experiencing intense left shoulder pain and an inability to use his arm  He was unable to reduce the shoulder himself, so he underwent successful reduction in the ER  He was placed in a shoulder immobilizer and referred to orthopedics  He denies any associated bruising, swelling, numbness, tingling, or mechanical symptoms    He denies any previous history of injury or dislocation  He is not currently taking any medications for pain  PMHx:         Past Medical History:   Diagnosis Date    Hypogonadism in male     Resolved 2017        No past surgical history on file      Family History   Problem Relation Age of Onset    Other Mother     Stroke Mother     Diabetes Mother     Thyroid disease Mother        Social History     Socioeconomic History    Marital status:      Spouse name: Not on file    Number of children: Not on file    Years of education: Not on file    Highest education level: Not on file   Occupational History    Not on file   Social Needs    Financial resource strain: Not on file    Food insecurity:     Worry: Not on file     Inability: Not on file    Transportation needs:     Medical: Not on file     Non-medical: Not on file   Tobacco Use    Smoking status: Former Smoker     Types: Cigarettes     Last attempt to quit:      Years since quittin 8    Smokeless tobacco: Never Used   Substance and Sexual Activity    Alcohol use: No    Drug use: Not on file    Sexual activity: Not on file   Lifestyle    Physical activity:     Days per week: Not on file     Minutes per session: Not on file    Stress: Not on file   Relationships    Social connections:     Talks on phone: Not on file     Gets together: Not on file     Attends Restoration service: Not on file     Active member of club or organization: Not on file     Attends meetings of clubs or organizations: Not on file     Relationship status: Not on file    Intimate partner violence:     Fear of current or ex partner: Not on file     Emotionally abused: Not on file     Physically abused: Not on file     Forced sexual activity: Not on file   Other Topics Concern    Not on file   Social History Narrative    Not on file       Current Outpatient Medications   Medication Sig Dispense Refill    cyanocobalamin (VITAMIN B-12) 100 mcg tablet Take by mouth daily       No current facility-administered medications for this visit  Allergies: Patient has no known allergies  ROS:  Positive for musculoskeletal complaints as noted above  The remaining 11/12 systems on the intake sheet that I reviewed were negative  PE:  /71 (BP Location: Right arm, Patient Position: Sitting, Cuff Size: Adult)   Pulse 55   Wt 83 5 kg (184 lb)   BMI 27 98 kg/m²   Constitutional: The patient was  oriented to person, place, and time  Well-developed and well-nourished  In no acute distress  HEENT: Vision intact  Hearing normal  Swallowing normal   Head: Normocephalic  Cardiovascular: Intact distal pulses  Pulse regular  Pulmonary/Chest: Effort normal  No respiratory distress  Neurological: Alert and oriented to person, place, and time  Skin: Skin is warm  Psychiatric: Normal mood and affect  Ortho Exam:    Left shoulder - patient presents with no obvious anatomical deformity  Skin is warm dry to touch with no signs of erythema, ecchymosis, or infection  He is mildly palpation of the long head biceps tendon and bicipital groove  He is also mildly tender to palpation or process  He is able demonstrate full active and passive elbow ROM with no antecubital adenopathy  He exhibits full wrist, and finger range of motion  Radial, median, ulnar motor and sensory distributions intact  Axillary sensory distribution intact  Patient is able to demonstrate full flexion at 180°, active abduction to 180°, mild apprehension in high 5 position  External rotation to 90°  Strength testing deferred due to recent, unprovoked, dislocation  2+ distal radial pulse with brisk capillary refill of fingers  Sensation to light touch intact distally  Studies reviewed:  I personally reviewed left shoulder x-rays and the radiologist's report  There is an anterior subcoracoid dislocation of the left shoulder without fracture    Post reduction x-rays show anatomic reduction of the shoulder joint    Scribe Attestation    I,:   Aishwarya Ferrell am acting as a scribe while in the presence of the attending physician :        I,:   Mulu Dickinson MD personally performed the services described in this documentation    as scribed in my presence :

## 2019-11-11 NOTE — PATIENT INSTRUCTIONS
This is a serious injury in a young man with no apparent trauma  It is very unusual for a first-time dislocation to occur in  sleep, although much more common with recurrent dislocations  In any case he needs to give this a chance to calm down and the ligaments to begin healing  I put him in a shoulder mobilizer he should wear full-time for 3 weeks  I showed him exercises to do in and out of the sling  He may take it off for bathing as long as his wife helps him and he keeps his arm in at his side when he is bathing  The problem with these injuries is recurrence and we are always concerned about a dislocation happening again which would require surgery  He can put ice on the area for 15 minutes 4 times a day if needed for pain and can take Advil, Aleve, or Tylenol if needed  He should sleep semi sitting on a wedge pillow, multiple pillows or in a recliner so the arm does not slide off his chest at night    He really should not work as a  for a minimum of 3 weeks to allow the soft tissues to heal   I will see him in 3 weeks for clinical re exam, discontinuance of his sling, and beginning of a range-of-motion exercise program 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

## 2019-11-16 NOTE — ED ATTENDING ATTESTATION
11/11/2019  I, Ishaan Roche MD, saw and evaluated the patient  I have discussed the patient with the resident/non-physician practitioner and agree with the resident's/non-physician practitioner's findings, Plan of Care, and MDM as documented in the resident's/non-physician practitioner's note, except where noted  All available labs and Radiology studies were reviewed  I was present for key portions of any procedure(s) performed by the resident/non-physician practitioner and I was immediately available to provide assistance  At this point I agree with the current assessment done in the Emergency Department  I have conducted an independent evaluation of this patient a history and physical is as follows:    ED Course         Critical Care Time  Procedures    Patient is a 57-year-old male that reports to the emergency department with dislocation of his left shoulder  Unknown as to the exact mechanism is he notes that he simply woke up with the shoulder dislocated  Initially, patient with very minimal range of motion due to severe pain  Dislocation verified by x-ray, pain was sharp, severe, worse with movement, nonradiating  No known trauma  Patient had a successful reduction using the [facility] technique, put in a sling, follow up with Orthopedics

## 2019-11-19 ENCOUNTER — HOSPITAL ENCOUNTER (EMERGENCY)
Facility: HOSPITAL | Age: 34
Discharge: HOME | End: 2019-11-19
Attending: EMERGENCY MEDICINE
Payer: OTHER GOVERNMENT

## 2019-11-19 ENCOUNTER — TELEPHONE (OUTPATIENT)
Dept: PHYSICAL THERAPY | Facility: OTHER | Age: 34
End: 2019-11-19

## 2019-11-19 VITALS
BODY MASS INDEX: 28.28 KG/M2 | SYSTOLIC BLOOD PRESSURE: 158 MMHG | WEIGHT: 186 LBS | DIASTOLIC BLOOD PRESSURE: 97 MMHG | RESPIRATION RATE: 17 BRPM | TEMPERATURE: 97.8 F | OXYGEN SATURATION: 97 % | HEART RATE: 63 BPM

## 2019-11-19 DIAGNOSIS — S39.012A STRAIN OF LUMBAR REGION, INITIAL ENCOUNTER: Primary | ICD-10-CM

## 2019-11-19 DIAGNOSIS — M54.32 SCIATICA OF LEFT SIDE: ICD-10-CM

## 2019-11-19 PROCEDURE — 99284 EMERGENCY DEPT VISIT MOD MDM: CPT | Performed by: PHYSICIAN ASSISTANT

## 2019-11-19 PROCEDURE — 99283 EMERGENCY DEPT VISIT LOW MDM: CPT

## 2019-11-19 PROCEDURE — 96372 THER/PROPH/DIAG INJ SC/IM: CPT

## 2019-11-19 RX ORDER — LIDOCAINE 50 MG/G
1 PATCH TOPICAL ONCE
Status: DISCONTINUED | OUTPATIENT
Start: 2019-11-19 | End: 2019-11-19 | Stop reason: HOSPADM

## 2019-11-19 RX ORDER — NAPROXEN 500 MG/1
500 TABLET ORAL 2 TIMES DAILY WITH MEALS
Qty: 30 TABLET | Refills: 0 | Status: SHIPPED | OUTPATIENT
Start: 2019-11-19

## 2019-11-19 RX ORDER — KETOROLAC TROMETHAMINE 30 MG/ML
15 INJECTION, SOLUTION INTRAMUSCULAR; INTRAVENOUS ONCE
Status: COMPLETED | OUTPATIENT
Start: 2019-11-19 | End: 2019-11-19

## 2019-11-19 RX ORDER — METHYLPREDNISOLONE 4 MG/1
TABLET ORAL
Qty: 21 TABLET | Refills: 0 | Status: SHIPPED | OUTPATIENT
Start: 2019-11-19

## 2019-11-19 RX ORDER — LIDOCAINE 50 MG/G
1 PATCH TOPICAL DAILY
Qty: 30 PATCH | Refills: 0 | Status: SHIPPED | OUTPATIENT
Start: 2019-11-19

## 2019-11-19 RX ORDER — ACETAMINOPHEN 325 MG/1
650 TABLET ORAL ONCE
Status: COMPLETED | OUTPATIENT
Start: 2019-11-19 | End: 2019-11-19

## 2019-11-19 RX ORDER — METHOCARBAMOL 500 MG/1
500 TABLET, FILM COATED ORAL 3 TIMES DAILY PRN
Qty: 20 TABLET | Refills: 0 | Status: SHIPPED | OUTPATIENT
Start: 2019-11-19

## 2019-11-19 RX ADMIN — ACETAMINOPHEN 650 MG: 325 TABLET ORAL at 14:07

## 2019-11-19 RX ADMIN — LIDOCAINE 1 PATCH: 50 PATCH TOPICAL at 14:06

## 2019-11-19 RX ADMIN — KETOROLAC TROMETHAMINE 15 MG: 30 INJECTION, SOLUTION INTRAMUSCULAR at 14:06

## 2019-11-19 NOTE — TELEPHONE ENCOUNTER
Message left for Pt to call us back  Our ph # and hours of business provided  Waiting for return call from Pt  This was our first attempt at reaching this Pt  Pt has [employer], and we betty to ask him if his coverage is prime, or remote

## 2019-11-20 NOTE — ED PROVIDER NOTES
History  Chief Complaint   Patient presents with    Back Pain     R sided lower back pain (pain shooting down R leg) x1 5 weeks  When pt experienced arm injury (2 weeks ago), back pain started with a "tinge of pain"     37 y o  Male presents with right sided low back pain that has been present approximately 1-1 5 weeks  Was intermittent and rated as a "tinge" but has been getting progressively worse  Notes today while he was at work pain became sharp, constant and now radiates to posterior thigh and anterior thigh and knee  Denies numbness, tingling, loss of strength, sensation, loss of bowel or bladder continence  Pain is worse with positional changes, and sitting  Pt  Notes he is a  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 Taking? cyanocobalamin (VITAMIN B-12) 100 mcg tablet  Self Yes No   Sig: Take by mouth daily      Facility-Administered Medications: None       Past Medical History:   Diagnosis Date    Hypogonadism in male     Resolved 2017        History reviewed  No pertinent surgical history  Family History   Problem Relation Age of Onset    Other Mother     Stroke Mother     Diabetes Mother     Thyroid disease Mother      I have reviewed and agree with the history as documented  Social History     Tobacco Use    Smoking status: Former Smoker     Types: Cigarettes     Last attempt to quit: 2011     Years since quittin 8    Smokeless tobacco: Never Used   Substance Use Topics    Alcohol use: No     Comment: soc    Drug use: Not Currently        Review of Systems   Musculoskeletal: Positive for back pain and myalgias  All other systems reviewed and are negative  Physical Exam  Physical Exam   Constitutional: He is oriented to person, place, and time   He appears well-developed and well-nourished  HENT:   Head: Normocephalic and atraumatic  Right Ear: External ear normal    Left Ear: External ear normal    Nose: Nose normal    Eyes: Conjunctivae and EOM are normal    Neck: Normal range of motion  Neck supple  Cardiovascular: Intact distal pulses  Pulmonary/Chest: Effort normal and breath sounds normal    Abdominal: Soft  Musculoskeletal: He exhibits tenderness  Lumbar back: He exhibits decreased range of motion, tenderness, pain and spasm  Back:    Neurological: He is alert and oriented to person, place, and time  He displays normal reflexes  Coordination normal    Skin: Skin is warm and dry  Capillary refill takes less than 2 seconds  Psychiatric: He has a normal mood and affect  Nursing note and vitals reviewed  Vital Signs  ED Triage Vitals [11/19/19 1239]   Temperature Pulse Respirations Blood Pressure SpO2   97 8 °F (36 6 °C) 63 17 158/97 97 %      Temp Source Heart Rate Source Patient Position - Orthostatic VS BP Location FiO2 (%)   Oral Monitor -- -- --      Pain Score       Worst Possible Pain           Vitals:    11/19/19 1239   BP: 158/97   Pulse: 63         Visual Acuity      ED Medications  Medications   ketorolac (TORADOL) injection 15 mg (15 mg Intramuscular Given 11/19/19 1406)   acetaminophen (TYLENOL) tablet 650 mg (650 mg Oral Given 11/19/19 1407)       Diagnostic Studies  Results Reviewed     None                 No orders to display              Procedures  Procedures       ED Course                               MDM  Number of Diagnoses or Management Options  Sciatica of left side: new and requires workup  Strain of lumbar region, initial encounter: new and requires workup  Diagnosis management comments: PT is A&Ox3, VSS,afebrile, NAD, nontoxic appearing, + pain with palpation to right low lumbar area, + spasm, + straight leg raise at approximately 45-60degress, sensation intact, able to ambulate   Exam and history consistent with lumbar radiculopathy, + palpable spasm low lumbar region    Will give toradol, prednisone and lidocaine patch  Discussed strict return precautions need for follow up and continued conservative management  PT verbalized understanding      Disposition  Final diagnoses:   Strain of lumbar region, initial encounter   Sciatica of left side     Time reflects when diagnosis was documented in both MDM as applicable and the Disposition within this note     Time User Action Codes Description Comment    11/19/2019  2:09 PM Adriana Blanks Add [S39 012A] Strain of lumbar region, initial encounter     11/19/2019  2:09 PM Adriana Blanks Add [M54 32] Sciatica of left side       ED Disposition     ED Disposition Condition Date/Time Comment    Discharge Stable Tue Nov 19, 2019  2:09 PM Axel Dickson discharge to home/self care              Follow-up Information     Follow up With Specialties Details Why Contact Info    Ajay Green MD Internal Medicine   [address]   [phone]            Discharge Medication List as of 11/19/2019  2:11 PM      START taking these medications    Details   lidocaine (LIDODERM) 5 % Apply 1 patch topically daily Remove & Discard patch within 12 hours or as directed by MD, Starting Tue 11/19/2019, Normal      methocarbamol (ROBAXIN) 500 mg tablet Take 1 tablet (500 mg total) by mouth 3 (three) times a day as needed for muscle spasms, Starting Tue 11/19/2019, Normal      methylPREDNISolone 4 MG tablet therapy pack Use as directed on package, Normal      naproxen (NAPROSYN) 500 mg tablet Take 1 tablet (500 mg total) by mouth 2 (two) times a day with meals, Starting Tue 11/19/2019, Normal         CONTINUE these medications which have NOT CHANGED    Details   cyanocobalamin (VITAMIN B-12) 100 mcg tablet Take by mouth daily, Historical Med               ED Provider  Electronically Signed by           Pierce Solis PA-C  11/19/19 6601

## 2019-11-21 ENCOUNTER — NURSE TRIAGE (OUTPATIENT)
Dept: PHYSICAL THERAPY | Facility: OTHER | Age: 34
End: 2019-11-21

## 2019-11-21 DIAGNOSIS — M54.41 ACUTE RIGHT-SIDED LOW BACK PAIN WITH RIGHT-SIDED SCIATICA: Primary | ICD-10-CM

## 2019-11-21 NOTE — TELEPHONE ENCOUNTER
Background - Initial Assessment  Clinical complaint: Acute low back right sided, radiates down right leg to his knee  No known injury, no back/neck surgeries, and is not following any specialist  Pt taking Robaxin, Lidocaine patch, steroids, and Naproxen, and is using ice  Pt has had this for the past two weeks  Pt has relief when he goes for a walk, he is a runner and has stopped since he has had this back pain  Date of onset: 2 weeks  Frequency of pain: constant  Quality of pain: sharp    Protocols used: [facility] COMPREHENSIVE SPINE PROGRAM PROTOCOL    This nurse did review in detail the comp spine program and what we can provide for the pt for their back pain  Pt is agreeable to being triaged by this nurse and would like to have physical therapy  Referrals were placed to the following:  Physical Therapy at the [facility] site  Pt is aware that he will be receiving a phone call to set up his appointment  Pt was also given the ph # to that office  No further questions voiced at this time and Pt did state understanding of the referral that was placed

## 2020-11-13 ENCOUNTER — TELEPHONE (OUTPATIENT)
Dept: INTERNAL MEDICINE CLINIC | Facility: CLINIC | Age: 35
End: 2020-11-13

## 2021-07-23 ENCOUNTER — APPOINTMENT (EMERGENCY)
Dept: RADIOLOGY | Facility: HOSPITAL | Age: 36
End: 2021-07-23

## 2021-07-23 ENCOUNTER — HOSPITAL ENCOUNTER (EMERGENCY)
Facility: HOSPITAL | Age: 36
Discharge: HOME | End: 2021-07-23
Attending: EMERGENCY MEDICINE | Admitting: EMERGENCY MEDICINE
Payer: OTHER GOVERNMENT

## 2021-07-23 VITALS
TEMPERATURE: 98.6 F | RESPIRATION RATE: 20 BRPM | BODY MASS INDEX: 28.59 KG/M2 | HEART RATE: 56 BPM | WEIGHT: 188 LBS | DIASTOLIC BLOOD PRESSURE: 68 MMHG | OXYGEN SATURATION: 99 % | SYSTOLIC BLOOD PRESSURE: 126 MMHG

## 2021-07-23 DIAGNOSIS — S43.015A ANTERIOR DISLOCATION OF LEFT SHOULDER, INITIAL ENCOUNTER: Primary | ICD-10-CM

## 2021-07-23 PROCEDURE — 99283 EMERGENCY DEPT VISIT LOW MDM: CPT

## 2021-07-23 PROCEDURE — 99284 EMERGENCY DEPT VISIT MOD MDM: CPT | Performed by: EMERGENCY MEDICINE

## 2021-07-23 PROCEDURE — 73030 X-RAY EXAM OF SHOULDER: CPT

## 2021-07-23 PROCEDURE — 99152 MOD SED SAME PHYS/QHP 5/>YRS: CPT | Performed by: EMERGENCY MEDICINE

## 2021-07-23 PROCEDURE — 23650 CLTX SHO DSLC W/MNPJ WO ANES: CPT | Performed by: EMERGENCY MEDICINE

## 2021-07-23 RX ORDER — PROPOFOL 10 MG/ML
85 INJECTION, EMULSION INTRAVENOUS ONCE
Status: COMPLETED | OUTPATIENT
Start: 2021-07-23 | End: 2021-07-23

## 2021-07-23 RX ADMIN — PROPOFOL 85 MG: 10 INJECTION, EMULSION INTRAVENOUS at 03:59

## 2021-07-23 NOTE — DISCHARGE INSTRUCTIONS
You can take tylenol and motrin at home as needed for pain  Remain in the splint until following up with orthopedics  Please return to the emergency department if you develop worsening pain, numbness, weakness, color change in your hand, or anything else concerning to you

## 2021-07-23 NOTE — ED PROCEDURE NOTE
Procedure  Pre-Procedural Sedation  Performed by: Adore Brooks DO  Authorized by: Adore Brooks DO     Consent:     Consent obtained:  Verbal and written    Consent given by:  Patient    Risks discussed:   Allergic reaction, prolonged hypoxia resulting in organ damage, respiratory compromise necessitating ventilatory assistance and intubation, nausea, vomiting and inadequate sedation    Alternatives discussed:  Analgesia without sedation  Universal protocol:     Procedure explained and questions answered to patient or proxy's satisfaction: yes      Immediately prior to procedure a time out was called: yes      Patient identity confirmation method:  Verbally with patient  Indications:     Sedation purpose:  Dislocation reduction    Procedure necessitating sedation performed by:  Different physician    Intended level of sedation:  Moderate (conscious sedation)  Pre-sedation assessment:     NPO status caution: urgency dictates proceeding with non-ideal NPO status      ASA classification: class 1 - normal, healthy patient      Neck mobility: normal      Mouth opening:  3 or more finger widths    Thyromental distance:  4 finger widths    Mallampati score:  I - soft palate, uvula, fauces, pillars visible    Pre-sedation assessments completed and reviewed: airway patency, cardiovascular function, mental status, nausea/vomiting and respiratory function      History of difficult intubation: no      Pre-sedation assessment completed:  7/23/2021 4:15 AM  Procedural Sedation    Date/Time: 7/23/2021 4:29 AM  Performed by: Adore Brooks DO  Authorized by: Adore Brooks DO     Immediate pre-procedure details:     Reassessment: Patient reassessed immediately prior to procedure      Reviewed: vital signs      Verified: bag valve mask available, emergency equipment available, intubation equipment available, IV patency confirmed, oxygen available and suction available    Procedure details (see MAR for exact dosages):     Sedation start time:  7/23/2021 4:20 AM    Preoxygenation:  Nasal cannula    Sedation:  Propofol    Intra-procedure monitoring:  Blood pressure monitoring, continuous capnometry, frequent LOC assessments, cardiac monitor, continuous pulse oximetry and frequent vital sign checks    Intra-procedure events: none      Sedation end time:  7/23/2021 4:31 AM    Total sedation time (minutes):  10  Post-procedure details:     Post-sedation assessment completed:  7/23/2021 4:31 AM    Attendance: Constant attendance by certified staff until patient recovered      Recovery: Patient returned to pre-procedure baseline      Post-sedation assessments completed and reviewed: airway patency, cardiovascular function, mental status, nausea/vomiting, pain level and respiratory function      Patient is stable for discharge or admission: yes      Patient tolerance:   Tolerated well, no immediate complications                     Ruben Melton [place], [place]  07/23/21 5753

## 2021-07-23 NOTE — ED PROVIDER NOTES
History  Chief Complaint   Patient presents with    Shoulder Injury     pt c/o left shoulder pain while sleeping  pt states he dislocated his shoulder 2 years ago in his sleep      13-year-old male with history of left anterior shoulder dislocation who presents for evaluation of shoulder pain  Patient reports that he was sleeping when he suddenly awoke with pain in his left shoulder  This is the same thing that happened when he previously dislocated the left shoulder  He had followed up with Ortho after his previous dislocation and no surgical intervention was planned  He denies any trauma to the area  He only has pain in the left shoulder  He denies numbness, weakness  He has not taken any medications prior to arrival           Prior to Admission Medications   Prescriptions Last Dose Informant Patient Reported? Taking? cyanocobalamin (VITAMIN B-12) 100 mcg tablet  Self Yes Yes   Sig: Take by mouth daily   lidocaine (LIDODERM) 5 %   No Yes   Sig: Apply 1 patch topically daily Remove & Discard patch within 12 hours or as directed by MD   methocarbamol (ROBAXIN) 500 mg tablet   No Yes   Sig: Take 1 tablet (500 mg total) by mouth 3 (three) times a day as needed for muscle spasms   methylPREDNISolone 4 MG tablet therapy pack   No Yes   Sig: Use as directed on package   naproxen (NAPROSYN) 500 mg tablet   No Yes   Sig: Take 1 tablet (500 mg total) by mouth 2 (two) times a day with meals      Facility-Administered Medications: None       Past Medical History:   Diagnosis Date    Hypogonadism in male     Resolved 11/27/2017        History reviewed  No pertinent surgical history  Family History   Problem Relation Age of Onset    Other Mother     Stroke Mother     Diabetes Mother     Thyroid disease Mother      I have reviewed and agree with the history as documented      E-Cigarette/Vaping     E-Cigarette/Vaping Substances     Social History     Tobacco Use    Smoking status: Former Smoker     Types: Cigarettes     Quit date: 2011     Years since quitting: 10 5    Smokeless tobacco: Never Used   Substance Use Topics    Alcohol use: No     Comment: soc    Drug use: Not Currently        Review of Systems   Constitutional: Negative for fever  Respiratory: Negative for shortness of breath  Cardiovascular: Negative for chest pain  Gastrointestinal: Negative for abdominal pain, nausea and vomiting  Genitourinary: Negative for flank pain  Musculoskeletal: Positive for arthralgias  Skin: Negative for wound  Neurological: Negative for weakness, light-headedness and numbness  All other systems reviewed and are negative  Physical Exam  ED Triage Vitals [07/23/21 0258]   Temperature Pulse Respirations Blood Pressure SpO2   98 6 °F (37 °C) 64 16 148/84 98 %      Temp Source Heart Rate Source Patient Position - Orthostatic VS BP Location FiO2 (%)   Tympanic Monitor Lying Right arm --      Pain Score       7             Orthostatic Vital Signs  Vitals:    07/23/21 0404 07/23/21 0409 07/23/21 0420 07/23/21 0430   BP: 128/79 142/85 126/74 126/68   Pulse: 64 62 60 56   Patient Position - Orthostatic VS: Lying Lying         Physical Exam  Vitals and nursing note reviewed  Constitutional:       General: He is not in acute distress  HENT:      Head: Normocephalic and atraumatic  Nose: Nose normal    Cardiovascular:      Rate and Rhythm: Normal rate and regular rhythm  Heart sounds: No murmur heard  No friction rub  No gallop  Comments: 2+ radial pulses bilaterally  Pulmonary:      Effort: Pulmonary effort is normal       Breath sounds: Normal breath sounds  No wheezing, rhonchi or rales  Abdominal:      General: Bowel sounds are normal  There is no distension  Palpations: Abdomen is soft  Tenderness: There is no abdominal tenderness  Musculoskeletal:      Cervical back: Normal range of motion and neck supple  Comments: The left upper extremity is held in adduction  There is a palpable deformity to the left shoulder consistent with anterior dislocation  Limited range of motion of the left shoulder secondary to pain  Skin:     General: Skin is warm and dry  Findings: No rash  Comments: No external signs of trauma  Neurological:      General: No focal deficit present  Mental Status: He is alert and oriented to person, place, and time  Comments: 5/5  strength in left upper extremity  Sensation is intact to the left upper extremity  Psychiatric:         Behavior: Behavior normal          ED Medications  Medications   propofol (DIPRIVAN) 200 MG/20ML bolus injection 85 mg (85 mg Intravenous Given 7/23/21 0359)       Diagnostic Studies  Results Reviewed     None                 XR shoulder 2+ views LEFT    (Results Pending)         Procedures  Orthopedic injury treatment    Date/Time: 7/23/2021 4:26 AM  Performed by: Isaura Hood MD  Authorized by: Isaura Hood MD     Patient Location:  ED  Other Assisting Provider: Yes (comment) ([place])    Written consent obtained?: Yes    Risks and benefits: Risks, benefits and alternatives were discussed    Consent given by:  Patient  Patient states understanding of procedure being performed: Yes    Patient's understanding of procedure matches consent: Yes    Required items: Required blood products, implants, devices and special equipment available    Patient identity confirmed:  Verbally with patient  Injury location:  Shoulder  Location details:  Left shoulder  Injury type:  Dislocation  Dislocation type: anterior    Chronicity:  Recurrent  Hill-Sachs deformity?: No    Neurovascular status: Neurovascularly intact    Distal perfusion: normal    Neurological function: normal    Range of motion: reduced    Local anesthesia used?: No    General anesthesia used?: No    Sedation type:   Moderate (conscious) sedation (See separate Procedural Sedation form)  Skeletal traction used?: Yes    Immobilization: Sling  Neurovascular status: Neurovascularly intact    Distal perfusion: normal    Neurological function: normal    Range of motion: improved    Patient tolerance:  Patient tolerated the procedure well with no immediate complications          ED Course                             SBIRT 22yo+      Most Recent Value   SBIRT (22 yo +)   In order to provide better care to our patients, we are screening all of our patients for alcohol and drug use  Would it be okay to ask you these screening questions? No Filed at: 07/23/2021 0131                MDM  Number of Diagnoses or Management Options  Anterior dislocation of left shoulder, initial encounter: new and requires workup  Diagnosis management comments: 24-year-old male who presents for evaluation of left shoulder pain  Exam consistent with left anterior shoulder dislocation  The left upper extremity is neurovascularly intact  Patient consented for procedural sedation and dislocation reduction  Procedural sedation completed, dislocation discussed fully reduced  The left upper extremity was neurovascularly intact after reduction  No evidence of fracture on postreduction imaging  Patient placed in a sling  Advised follow-up with orthopedics  Return precautions discussed         Amount and/or Complexity of Data Reviewed  Tests in the radiology section of CPT®: ordered and reviewed  Review and summarize past medical records: yes    Risk of Complications, Morbidity, and/or Mortality  Presenting problems: high  Diagnostic procedures: low  Management options: moderate    Patient Progress  Patient progress: improved      Disposition  Final diagnoses:   Anterior dislocation of left shoulder, initial encounter     Time reflects when diagnosis was documented in both MDM as applicable and the Disposition within this note     Time User Action Codes Description Comment    7/23/2021  4:35 AM Rachael Michele Add [S4 015A] Anterior dislocation of left shoulder, initial encounter       ED Disposition     ED Disposition Condition Date/Time Comment    Discharge Stable Fri Jul 23, 2021  4:53 AM Orvin Dandy discharge to home/self care  Follow-up Information     Follow up With Specialties Details Why Contact Info Additional Information    Ata España MD Internal Medicine In 2 days  [address] Orthopedic Surgery Schedule an appointment as soon as possible for a visit   [address], [address], [place], [address] [facility], [address]  [facility]          Discharge Medication List as of 7/23/2021  4:54 AM      CONTINUE these medications which have NOT CHANGED    Details   cyanocobalamin (VITAMIN B-12) 100 mcg tablet Take by mouth daily, Historical Med      lidocaine (LIDODERM) 5 % Apply 1 patch topically daily Remove & Discard patch within 12 hours or as directed by MD, Starting Tue 11/19/2019, Normal      methocarbamol (ROBAXIN) 500 mg tablet Take 1 tablet (500 mg total) by mouth 3 (three) times a day as needed for muscle spasms, Starting Tue 11/19/2019, Normal      methylPREDNISolone 4 MG tablet therapy pack Use as directed on package, Normal      naproxen (NAPROSYN) 500 mg tablet Take 1 tablet (500 mg total) by mouth 2 (two) times a day with meals, Starting Tue 11/19/2019, Normal               PDMP Review     None           ED Provider  Attending physically available and evaluated Orvin Dandy I managed the patient along with the ED Attending      Electronically Signed by         Shashank Nagy MD  07/23/21 6899

## 2021-07-23 NOTE — ED ATTENDING ATTESTATION
7/23/2021  INeymar MD, saw and evaluated the patient  I have discussed the patient with the resident/non-physician practitioner and agree with the resident's/non-physician practitioner's findings, Plan of Care, and MDM as documented in the resident's/non-physician practitioner's note, except where noted  All available labs and Radiology studies were reviewed  I was present for key portions of any procedure(s) performed by the resident/non-physician practitioner and I was immediately available to provide assistance  At this point I agree with the current assessment done in the Emergency Department  I have conducted an independent evaluation of this patient a history and physical is as follows:    ED Course      Emergency Department Note- Rain Cervantes 38 y o  male MRN: [id number]    Unit/Bed#: ED 09 Encounter: [id number]    Rain Cervantes is a 38 y o  male who presents with   Chief Complaint   Patient presents with    Shoulder Injury     pt c/o left shoulder pain while sleeping  pt states he dislocated his shoulder 2 years ago in his sleep          History of Present Illness   HPI:  Rain Cervantes is a 38 y o  male who presents for evaluation of:  Acute left shoulder pain  Patient has h/o prior L shoulder dislocation after waking from sleep  Any movement provokes the L shoulder discomfort  Review of Systems   Constitutional: Negative for chills and fever  HENT: Negative for congestion and rhinorrhea  Respiratory: Negative for cough and shortness of breath  Cardiovascular: Negative for chest pain and palpitations  Gastrointestinal: Negative for nausea and vomiting  All other systems reviewed and are negative  Historical Information   Past Medical History:   Diagnosis Date    Hypogonadism in male     Resolved 11/27/2017      History reviewed  No pertinent surgical history    Social History   Social History     Substance and Sexual Activity   Alcohol Use No    Comment: soc Social History     Substance and Sexual Activity   Drug Use Not Currently     Social History     Tobacco Use   Smoking Status Former Smoker    Types: Cigarettes    Quit date: 2011    Years since quitting: 10 5   Smokeless Tobacco Never Used     Family History:   Family History   Problem Relation Age of Onset    Other Mother     Stroke Mother     Diabetes Mother     Thyroid disease Mother        Meds/Allergies   PTA meds:   Prior to Admission Medications   Prescriptions Last Dose Informant Patient Reported? Taking? cyanocobalamin (VITAMIN B-12) 100 mcg tablet  Self Yes Yes   Sig: Take by mouth daily   lidocaine (LIDODERM) 5 %   No Yes   Sig: Apply 1 patch topically daily Remove & Discard patch within 12 hours or as directed by MD   methocarbamol (ROBAXIN) 500 mg tablet   No Yes   Sig: Take 1 tablet (500 mg total) by mouth 3 (three) times a day as needed for muscle spasms   methylPREDNISolone 4 MG tablet therapy pack   No Yes   Sig: Use as directed on package   naproxen (NAPROSYN) 500 mg tablet   No Yes   Sig: Take 1 tablet (500 mg total) by mouth 2 (two) times a day with meals      Facility-Administered Medications: None     No Known Allergies    Objective   First Vitals:   Blood Pressure: 148/84 (07/23/21 0258)  Pulse: 64 (07/23/21 0258)  Temperature: 98 6 °F (37 °C) (07/23/21 0258)  Temp Source: Tympanic (07/23/21 0258)  Respirations: 16 (07/23/21 0258)  Weight - Scale: 85 3 kg (188 lb) (07/23/21 0258)  SpO2: 98 % (07/23/21 0258)    Current Vitals:   Blood Pressure: 148/84 (07/23/21 0258)  Pulse: 64 (07/23/21 0258)  Temperature: 98 6 °F (37 °C) (07/23/21 0258)  Temp Source: Tympanic (07/23/21 0258)  Respirations: 16 (07/23/21 0258)  Weight - Scale: 85 3 kg (188 lb) (07/23/21 0258)  SpO2: 98 % (07/23/21 0258)    No intake or output data in the 24 hours ending 07/23/21 0349    Invasive Devices     None                 Physical Exam  Vitals and nursing note reviewed     Constitutional:       Appearance: Normal appearance  HENT:      Head: Normocephalic and atraumatic  Right Ear: External ear normal       Left Ear: External ear normal       Nose: Nose normal       Mouth/Throat:      Mouth: Mucous membranes are moist       Pharynx: Oropharynx is clear  Eyes:      Conjunctiva/sclera: Conjunctivae normal       Pupils: Pupils are equal, round, and reactive to light  Cardiovascular:      Rate and Rhythm: Normal rate and regular rhythm  Pulmonary:      Effort: Pulmonary effort is normal       Breath sounds: Normal breath sounds  Musculoskeletal:         General: Tenderness (L shoulder) and deformity (L shoulder ) present  Skin:     General: Skin is warm  Capillary Refill: Capillary refill takes less than 2 seconds  Findings: No erythema  Neurological:      General: No focal deficit present  Mental Status: He is alert and oriented to person, place, and time  Coordination: Coordination normal    Psychiatric:         Mood and Affect: Mood normal          Behavior: Behavior normal          Thought Content: Thought content normal          Judgment: Judgment normal            Medical Decision Makin  Acute L glenohumeral dislocation: reduction  No results found for this or any previous visit (from the past 36 hour(s))  No orders to display         Portions of the record may have been created with voice recognition software  Occasional wrong word or "sound a like" substitutions may have occurred due to the inherent limitations of voice recognition software  Read the chart carefully and recognize, using context, where substitutions have occurred            Critical Care Time  Procedures

## 2023-08-16 DIAGNOSIS — Z32.2 ENCOUNTER FOR CHILDBIRTH INSTRUCTION: Primary | ICD-10-CM
